# Patient Record
Sex: MALE | Race: WHITE | NOT HISPANIC OR LATINO
[De-identification: names, ages, dates, MRNs, and addresses within clinical notes are randomized per-mention and may not be internally consistent; named-entity substitution may affect disease eponyms.]

---

## 2022-12-06 ENCOUNTER — APPOINTMENT (OUTPATIENT)
Dept: UROLOGY | Facility: CLINIC | Age: 87
End: 2022-12-06

## 2022-12-06 VITALS
OXYGEN SATURATION: 99 % | BODY MASS INDEX: 28.93 KG/M2 | HEIGHT: 66 IN | HEART RATE: 78 BPM | RESPIRATION RATE: 18 BRPM | WEIGHT: 180 LBS | SYSTOLIC BLOOD PRESSURE: 150 MMHG | DIASTOLIC BLOOD PRESSURE: 94 MMHG | TEMPERATURE: 97.3 F

## 2022-12-06 DIAGNOSIS — N52.9 MALE ERECTILE DYSFUNCTION, UNSPECIFIED: ICD-10-CM

## 2022-12-06 DIAGNOSIS — I10 ESSENTIAL (PRIMARY) HYPERTENSION: ICD-10-CM

## 2022-12-06 PROCEDURE — 99204 OFFICE O/P NEW MOD 45 MIN: CPT

## 2022-12-06 RX ORDER — LEVOTHYROXINE SODIUM 0.17 MG/1
175 TABLET ORAL
Refills: 0 | Status: COMPLETED | COMMUNITY

## 2022-12-06 RX ORDER — METOPROLOL SUCCINATE 200 MG/1
TABLET, EXTENDED RELEASE ORAL
Refills: 0 | Status: COMPLETED | COMMUNITY

## 2022-12-06 RX ORDER — DOXAZOSIN 8 MG/1
8 TABLET ORAL
Qty: 2 | Refills: 0 | Status: ACTIVE | COMMUNITY
Start: 2022-07-22

## 2022-12-06 RX ORDER — TAMSULOSIN HYDROCHLORIDE 0.4 MG/1
CAPSULE ORAL
Refills: 0 | Status: ACTIVE | COMMUNITY

## 2022-12-06 RX ORDER — LEVOTHYROXINE SODIUM 0.07 MG/1
75 TABLET ORAL
Qty: 2 | Refills: 0 | Status: ACTIVE | COMMUNITY
Start: 2022-07-22

## 2022-12-06 RX ORDER — SAXAGLIPTIN 5 MG/1
TABLET, FILM COATED ORAL
Refills: 0 | Status: COMPLETED | COMMUNITY

## 2022-12-06 RX ORDER — FINASTERIDE 1 MG/1
TABLET ORAL
Refills: 0 | Status: COMPLETED | COMMUNITY

## 2022-12-06 RX ORDER — TADALAFIL 5 MG/1
5 TABLET ORAL
Refills: 0 | Status: ACTIVE | COMMUNITY

## 2022-12-06 RX ORDER — METOPROLOL SUCCINATE 50 MG/1
50 TABLET, EXTENDED RELEASE ORAL
Qty: 2 | Refills: 0 | Status: ACTIVE | COMMUNITY
Start: 2022-07-22

## 2022-12-06 RX ORDER — DOXAZOSIN 2 MG/1
2 TABLET ORAL
Refills: 0 | Status: COMPLETED | COMMUNITY

## 2022-12-06 RX ORDER — FINASTERIDE 5 MG/1
5 TABLET, FILM COATED ORAL
Qty: 2 | Refills: 0 | Status: ACTIVE | COMMUNITY
Start: 2022-07-22

## 2022-12-06 RX ORDER — SIMVASTATIN 40 MG/1
40 TABLET, FILM COATED ORAL
Refills: 0 | Status: ACTIVE | COMMUNITY

## 2022-12-06 RX ORDER — SAXAGLIPTIN 5 MG/1
5 TABLET, FILM COATED ORAL
Qty: 30 | Refills: 0 | Status: ACTIVE | COMMUNITY
Start: 2022-07-11

## 2022-12-06 RX ORDER — MIRABEGRON 50 MG/1
50 TABLET, FILM COATED, EXTENDED RELEASE ORAL
Qty: 30 | Refills: 0 | Status: COMPLETED | COMMUNITY
Start: 2022-05-17

## 2022-12-06 RX ORDER — LEVETIRACETAM 1000 MG/1
1000 TABLET, FILM COATED ORAL
Refills: 0 | Status: ACTIVE | COMMUNITY

## 2022-12-06 RX ORDER — TAMSULOSIN HYDROCHLORIDE 0.4 MG/1
0.4 CAPSULE ORAL
Qty: 2 | Refills: 0 | Status: ACTIVE | COMMUNITY
Start: 2022-07-22

## 2022-12-06 RX ORDER — MIRABEGRON 50 MG/1
TABLET, FILM COATED, EXTENDED RELEASE ORAL
Refills: 0 | Status: COMPLETED | COMMUNITY

## 2022-12-06 RX ORDER — METFORMIN ER 750 MG 750 MG/1
TABLET ORAL
Refills: 0 | Status: COMPLETED | COMMUNITY

## 2022-12-06 RX ORDER — METFORMIN ER 500 MG 500 MG/1
500 TABLET ORAL
Qty: 4 | Refills: 0 | Status: ACTIVE | COMMUNITY
Start: 2022-07-22

## 2022-12-06 RX ORDER — GABAPENTIN 600 MG/1
600 TABLET, COATED ORAL
Qty: 60 | Refills: 0 | Status: COMPLETED | COMMUNITY
Start: 2022-11-18

## 2022-12-06 RX ORDER — LEVETIRACETAM 500 MG/1
500 TABLET, FILM COATED ORAL
Qty: 60 | Refills: 0 | Status: COMPLETED | COMMUNITY
Start: 2021-11-23

## 2022-12-06 NOTE — HISTORY OF PRESENT ILLNESS
[FreeTextEntry1] : Alexys is an 86-year-old male born 1935 who was sent to me for several reasons\par \par 1 he has significant voiding dysfunction.  His AUA symptom score includes\par Incomplete emptying–2\par Frequency–4\par Intermittency–3\par Urgency–4\par Slow stream–3\par Straining–2\par He wakes up 5 times at night and his AUA symptom score is therefore 18/5/5.\par On rare occasions he will have the urge incontinence and please note this is with Myrbetriq 25 mg nightly.  He had tried the 50 but it gave him too many side effects\par \par In  he had been seen and advised to have cystolitholapaxy for numerous stones in the bladder.  At that point, an MRI had been done January 3, 2012 which showed a prostate of almost 70 g, post void about 189 mL, a thickened irregular bladder with wall thickness up to 5 mm and numerous partially calcified stones plus minus gravel.  Alexys is not sure if he ever had surgery on this as he just does not remember\par \par His other issue and the main reason he came in is because his wife  about 3 years ago, he does not recall getting morning erections for about 2 years, he had not had relations with her for the 6 months before she  that she was ill, now he is considering looking for spouse but he cannot get an erection he wonders what he has to offer.  He was sent here by Dr. Abigail Osullivan so that we can try and find out what can be done to determine if he has erections and if they are not good what can be done to augment them in a safe fashion. [Urinary Incontinence] : urinary incontinence [Urinary Urgency] : urinary urgency [Urinary Frequency] : urinary frequency [Nocturia] : nocturia [Straining] : straining [Weak Stream] : weak stream [Intermittency] : intermittency [Post-Void Dribbling] : post-void dribbling [Erectile Dysfunction] : Erectile Dysfunction [Dysuria] : no dysuria [Hematuria - Gross] : no gross hematuria

## 2022-12-06 NOTE — PHYSICAL EXAM
[General Appearance - Well Developed] : well developed [General Appearance - Well Nourished] : well nourished [Normal Appearance] : normal appearance [Well Groomed] : well groomed [General Appearance - In No Acute Distress] : no acute distress [Heart Rate And Rhythm] : Heart rate and rhythm were normal [] : no respiratory distress [Respiration, Rhythm And Depth] : normal respiratory rhythm and effort [Exaggerated Use Of Accessory Muscles For Inspiration] : no accessory muscle use [Auscultation Breath Sounds / Voice Sounds] : lungs were clear to auscultation bilaterally [Abdomen Soft] : soft [Abdomen Tenderness] : non-tender [Abdomen Hernia] : no hernia was discovered [Costovertebral Angle Tenderness] : no ~M costovertebral angle tenderness [Urethral Meatus] : meatus normal [Penis Abnormality] : normal circumcised penis [Normal Station and Gait] : the gait and station were normal for the patient's age [FreeTextEntry1] : DTRs decreased but acceptable scars from both knee replacements [Oriented To Time, Place, And Person] : oriented to person, place, and time [Affect] : the affect was normal [Mood] : the mood was normal [Not Anxious] : not anxious

## 2022-12-06 NOTE — ASSESSMENT
[FreeTextEntry1] : Alexys is an 87-year-old male with a history of stones he has no history of urologic surgery and the exam shows a questionable palpable bladder.  He has severe voiding symptoms with an AUA symptom score of 18/5/5.  We will send urine off for analysis and culture, I am going to want a renal and bladder ultrasound and if he can record of his intake and output.  When he comes back and we will review would probably do a flow study.\par \par He also has a small right hydrocele and a apricot size right spermatocele we will get a scrotal ultrasound to make sure that is all that is\par \par He has early dermatophytosis, recommend to use powder such as Goldbond\par \par The main reason that he came his erectile dysfunction going to be a little bit more difficult to ascertain.  He is unorthodox to does not have a sexual partner and is not sure if he should look for sexual partner if he does not have a working penis.  To see what may be the cause we will get hormones, I am going to want [place] criteria classification and please note he is already on Cialis daily I am not sure what I can add to an 87-year-old ago not sure he be able or in fact willing to do a self injection.  Depending on what the hormones show we may decide to replace them but again he already has voiding dysfunction and if we had testosterone it may accelerate his BPH which is probably already a problem.\par \par We will try and see what is going on and then discuss options.\par \par Please note I did discuss with him that has less he is looking for very a woman he may find that a mature woman is happy to have companionship and may not be interested in sexual activity anyway.  He is indeed confirmed that if he had a woman that wanted companionship and did not care about sexual activity is not that interested either other than that he thinks he like a  and he does not think they would want a  that cannot provide sex.

## 2022-12-06 NOTE — LETTER HEADER
[FreeTextEntry3] : Rosa Pride M.D.\par Director Emeritus of Urology\par [facility]\par [address] [place] [address], [address]\[place] [address]

## 2022-12-06 NOTE — LETTER BODY
[Dear  ___] : Dear  [unfilled], [Consult Letter:] : I had the pleasure of evaluating your patient, [unfilled]. [Please see my note below.] : Please see my note below. [Consult Closing:] : Thank you very much for allowing me to participate in the care of this patient.  If you have any questions, please do not hesitate to contact me. [Sincerely,] : Sincerely, [FreeTextEntry2] : Abigail Osullivan MD\par [facility]\par [address] [facility]\par [place] [id number]

## 2022-12-08 LAB
APPEARANCE: CLEAR
BILIRUBIN URINE: NEGATIVE
BLOOD URINE: NEGATIVE
COLOR: NORMAL
GLUCOSE QUALITATIVE U: NEGATIVE
KETONES URINE: NEGATIVE
LEUKOCYTE ESTERASE URINE: NEGATIVE
NITRITE URINE: NEGATIVE
PH URINE: 5.5
PROTEIN URINE: NEGATIVE
SPECIFIC GRAVITY URINE: 1.02
UROBILINOGEN URINE: NORMAL

## 2022-12-13 ENCOUNTER — NON-APPOINTMENT (OUTPATIENT)
Age: 87
End: 2022-12-13

## 2022-12-13 LAB — BACTERIA UR CULT: ABNORMAL

## 2022-12-13 RX ORDER — NITROFURANTOIN (MONOHYDRATE/MACROCRYSTALS) 25; 75 MG/1; MG/1
100 CAPSULE ORAL
Qty: 14 | Refills: 0 | Status: ACTIVE | COMMUNITY
Start: 2022-12-13 | End: 1900-01-01

## 2022-12-15 ENCOUNTER — NON-APPOINTMENT (OUTPATIENT)
Age: 87
End: 2022-12-15

## 2022-12-16 ENCOUNTER — NON-APPOINTMENT (OUTPATIENT)
Age: 87
End: 2022-12-16

## 2022-12-19 ENCOUNTER — NON-APPOINTMENT (OUTPATIENT)
Age: 87
End: 2022-12-19

## 2022-12-19 LAB — BACTERIA UR CULT: NORMAL

## 2022-12-20 ENCOUNTER — NON-APPOINTMENT (OUTPATIENT)
Age: 87
End: 2022-12-20

## 2022-12-23 ENCOUNTER — NON-APPOINTMENT (OUTPATIENT)
Age: 87
End: 2022-12-23

## 2022-12-28 ENCOUNTER — LABORATORY RESULT (OUTPATIENT)
Age: 87
End: 2022-12-28

## 2022-12-28 ENCOUNTER — APPOINTMENT (OUTPATIENT)
Dept: UROLOGY | Facility: CLINIC | Age: 87
End: 2022-12-28
Payer: MEDICARE

## 2022-12-28 VITALS
BODY MASS INDEX: 29.25 KG/M2 | OXYGEN SATURATION: 98 % | TEMPERATURE: 97.2 F | DIASTOLIC BLOOD PRESSURE: 67 MMHG | RESPIRATION RATE: 16 BRPM | HEIGHT: 66 IN | WEIGHT: 182 LBS | SYSTOLIC BLOOD PRESSURE: 137 MMHG | HEART RATE: 71 BPM

## 2022-12-28 PROCEDURE — 99215 OFFICE O/P EST HI 40 MIN: CPT

## 2022-12-28 RX ORDER — MIRABEGRON 25 MG/1
25 TABLET, FILM COATED, EXTENDED RELEASE ORAL
Qty: 2 | Refills: 0 | Status: DISCONTINUED | COMMUNITY
Start: 2022-07-22 | End: 2022-12-28

## 2022-12-28 NOTE — ASSESSMENT
[FreeTextEntry1] : With respect to his voiding he needs to stop the Myrbetriq as he has a very high postvoid residual.  The reason he is urinating so often is incomplete emptying.  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  We will going to draw a BMP and a CBC on him today if he elects I would put in a catheter and then wait drawing down a little bit at a time until he is healthy and then send him home with a catheter.  We will then get repeat bloods and probably refer him to Dr. Pond to check his bladder see if it is working and consider treatment depending on what the urodynamic show.  The bladder stones will have to be removed I do not know if this is bladder bladder outlet or both.  Given his current exam I am assuming that at some point someone treated his prostate and existing bladder stones he has no recollection.\par \par As far as the scrotum he has the complex right spermatocele including a solid portion as well as calcification in the scrotal MRI was advised\par \par As far as his ED he never did the hormones and never spoke to his PCP know right now it is probably not high on the list of concern if he wants to address that he will need to get me the date of

## 2022-12-28 NOTE — PHYSICAL EXAM
[General Appearance - Well Developed] : well developed [General Appearance - Well Nourished] : well nourished [Normal Appearance] : normal appearance [Well Groomed] : well groomed [General Appearance - In No Acute Distress] : no acute distress [Abdomen Soft] : soft [Abdomen Tenderness] : non-tender [Costovertebral Angle Tenderness] : no ~M costovertebral angle tenderness [] : no respiratory distress [Respiration, Rhythm And Depth] : normal respiratory rhythm and effort [Exaggerated Use Of Accessory Muscles For Inspiration] : no accessory muscle use [Oriented To Time, Place, And Person] : oriented to person, place, and time [Affect] : the affect was normal [Mood] : the mood was normal [Not Anxious] : not anxious [Normal Station and Gait] : the gait and station were normal for the patient's age [FreeTextEntry1] : prostate > 15-20 grams and firm

## 2022-12-28 NOTE — LETTER BODY
[Dear  ___] : Dear  [unfilled], [Please see my note below.] : Please see my note below. [Sincerely,] : Sincerely, [Courtesy Letter:] : I had the pleasure of seeing your patient, [unfilled], in my office today. [FreeTextEntry2] : Abigail Osullivan MD\par [facility]\par [address] [facility]\par [place] [id number]

## 2022-12-28 NOTE — LETTER HEADER
[FreeTextEntry3] : Rosa Pride M.D.\par Director Emeritus of Urology\par [facility]\par [address] [place] [address], [address]\[place] [address]

## 2022-12-28 NOTE — HISTORY OF PRESENT ILLNESS
[Urinary Incontinence] : urinary incontinence [Urinary Urgency] : urinary urgency [Urinary Frequency] : urinary frequency [Nocturia] : nocturia [Straining] : straining [Weak Stream] : weak stream [Intermittency] : intermittency [Post-Void Dribbling] : post-void dribbling [Erectile Dysfunction] : Erectile Dysfunction [FreeTextEntry1] : Alexys is an 87-year-old male born November 8, 1935 seen December 6, 2022 complaining of\par Severe lower urinary tract symptoms for which she had him get a urinalysis and culture as well as a renal bladder ultrasound.  He has been told to have a cystolitholapaxy as far back as 2012 which at that time showed a prostate of 70 g and he does not remember if he ever did it.\par \par The culture came back as E. coli, because of his symptoms we started him on nitrofurantoin.  He had a repeat the culture that was done on December 22.  Please notify the urine on antibiotics he has noted no change in urination\par \par We sent him for the ultrasound which was done December 20 which the radiologist called me about please see the reports below but it showed severe obstructive uropathy.  Please note he is on Myrbetriq for urgency frequency\par \par He also had a lump in the right hemiscrotum we sent her for an ultrasound that was done on December 22, 2022\par \par \par He may want to get a partner but he does not know of his penis because it does not work I wanted hormones and [place] criteria.  He did not do the blood tests and when he went to see his primary care doctor he forgot to given the [place] criteria\par \par We brought him here urgently because of the bladder ultrasound and a UTI.\par  [Dysuria] : no dysuria [Hematuria - Gross] : no gross hematuria

## 2022-12-29 ENCOUNTER — NON-APPOINTMENT (OUTPATIENT)
Age: 87
End: 2022-12-29

## 2022-12-30 ENCOUNTER — APPOINTMENT (OUTPATIENT)
Dept: UROLOGY | Facility: CLINIC | Age: 87
End: 2022-12-30
Payer: MEDICARE

## 2022-12-30 VITALS
HEART RATE: 70 BPM | SYSTOLIC BLOOD PRESSURE: 137 MMHG | DIASTOLIC BLOOD PRESSURE: 73 MMHG | BODY MASS INDEX: 29.73 KG/M2 | HEIGHT: 66 IN | WEIGHT: 185 LBS

## 2022-12-30 DIAGNOSIS — A49.9 URINARY TRACT INFECTION, SITE NOT SPECIFIED: ICD-10-CM

## 2022-12-30 DIAGNOSIS — N39.0 URINARY TRACT INFECTION, SITE NOT SPECIFIED: ICD-10-CM

## 2022-12-30 DIAGNOSIS — N43.41 SPERMATOCELE OF EPIDIDYMIS, SINGLE: ICD-10-CM

## 2022-12-30 LAB
ANION GAP SERPL CALC-SCNC: 9 MMOL/L
BUN SERPL-MCNC: 40 MG/DL
CALCIUM SERPL-MCNC: 9.6 MG/DL
CHLORIDE SERPL-SCNC: 106 MMOL/L
CO2 SERPL-SCNC: 26 MMOL/L
CREAT SERPL-MCNC: 1.6 MG/DL
EGFR: 42 ML/MIN/1.73M2
GLUCOSE SERPL-MCNC: 128 MG/DL
POTASSIUM SERPL-SCNC: 5.2 MMOL/L
SODIUM SERPL-SCNC: 141 MMOL/L

## 2022-12-30 PROCEDURE — 99214 OFFICE O/P EST MOD 30 MIN: CPT

## 2022-12-30 NOTE — HISTORY OF PRESENT ILLNESS
[Urinary Incontinence] : urinary incontinence [Urinary Urgency] : urinary urgency [Urinary Frequency] : urinary frequency [Nocturia] : nocturia [Straining] : straining [Weak Stream] : weak stream [Intermittency] : intermittency [Post-Void Dribbling] : post-void dribbling [Erectile Dysfunction] : Erectile Dysfunction [FreeTextEntry1] : Alexys is an 87-year-old male born November 8, 1935 seen December 28, 2022 for an urgent visit and an ultrasound showed significant bilateral hydroureteronephrosis incomplete bladder emptying with bladder stones.\par He also had a lump in the right hemiscrotum that the radiologist felt needed an MRI\par \par We had him stop the Myrbetriq as he had a very high postvoid, his frequency of urination was not due to bladder instability.  He is aware that I do not know if his bladder can work I had wanted to put in a catheter which she refused in the interim we sent him for a BMP and a CBC we had a CAT scan and still needs an MRI as soon as we can.  I also wanted to repeat the urinalysis and culture as he had an infection and finish the antibiotics. He did that 12/22 and that was NG\par \par \par He tells me since stopping the Myrbetriq his stream is much stronger he feels a large\par Was coming out and his urine is clean\par  [Dysuria] : no dysuria [Hematuria - Gross] : no gross hematuria

## 2022-12-30 NOTE — ASSESSMENT
[FreeTextEntry1] : I again recommended a catheter he feels he is urinating much better since he stopped the Myrbetriq and wants to hold off over the weekend.  He understands that an elevated potassium was 5.2 is not dangerous but if it continues to go up can be and that his bladder outlet obstruction is compromising his renal function.\par \par He is going to need cystolitholapaxy, and will also be difficult because of the stones in the diverticulum and we may have to do with diverticuli to me.  It is probable that he will need relief from bladder outlet obstruction.  Even if his bladder has been no prescription does not work if we could make his outflow resistance less than normal without making him incontinent he may be able to void better with Valsalva.  He testy right now some of the urine is coming out by itself but it does not come out as good as he can if he is not pushing.  I am hopeful that he retains some bladder function but he is aware that may not be enough\par \par He still needs the MRI of the scrotum though that is not a high priority is something we will need to get\par \par He is declining catheterization even CIC and is willing to follow-up next week knowing that his potassium and his creatinine are elevated\par \par If his residual has dropped next week and he is willing to accept that he may have prostate surgery in the bladder may not work I think he can be booked for cystolitholapaxy and a simultaneous TURP.  If he wants to know if the TURP will work or if his bladder is nonfunctional then he may need urodynamics either before or after cystolitholapaxy\par \par \par As far as his anemia and other blood issues he will discuss that with his PCP\par \par If he decides he wants testing I will send him to Dr. Pond for video urodynamics especially given the diverticulum the video would be quite important

## 2022-12-30 NOTE — LETTER BODY
[Dear  ___] : Dear  [unfilled], [Courtesy Letter:] : I had the pleasure of seeing your patient, [unfilled], in my office today. [Please see my note below.] : Please see my note below. [Sincerely,] : Sincerely, [FreeTextEntry2] : Abigail Osullivan MD\par [facility]\par [address] [facility]\par [place] [id number]

## 2022-12-30 NOTE — ADDENDUM
[FreeTextEntry1] : Patient was told us that has been trying to reach him numerous times for example when his potassium came back at 5.2 and there was no answer.  He acknowledges that sometimes he forgets his cell phone at home he will try and remember to take it with him

## 2022-12-30 NOTE — PHYSICAL EXAM
[General Appearance - Well Developed] : well developed [General Appearance - Well Nourished] : well nourished [Normal Appearance] : normal appearance [Well Groomed] : well groomed [General Appearance - In No Acute Distress] : no acute distress [] : no respiratory distress [Respiration, Rhythm And Depth] : normal respiratory rhythm and effort [Exaggerated Use Of Accessory Muscles For Inspiration] : no accessory muscle use [Oriented To Time, Place, And Person] : oriented to person, place, and time [Affect] : the affect was normal [Mood] : the mood was normal [Not Anxious] : not anxious [Normal Station and Gait] : the gait and station were normal for the patient's age [FreeTextEntry1] : BMI equals 29

## 2022-12-30 NOTE — LETTER HEADER
[FreeTextEntry3] : Rosa Pride M.D.\par Director Emeritus of Urology\par [facility]\par [address] [place] [address], [address]\[place] [address]

## 2023-01-02 LAB
APPEARANCE: CLEAR
BACTERIA UR CULT: NORMAL
BILIRUBIN URINE: NEGATIVE
BLOOD URINE: NEGATIVE
COLOR: NORMAL
GLUCOSE QUALITATIVE U: NEGATIVE
KETONES URINE: NEGATIVE
LEUKOCYTE ESTERASE URINE: NEGATIVE
NITRITE URINE: NEGATIVE
PH URINE: 5.5
PROTEIN URINE: NEGATIVE
SPECIFIC GRAVITY URINE: 1.02
UROBILINOGEN URINE: NORMAL

## 2023-01-03 ENCOUNTER — LABORATORY RESULT (OUTPATIENT)
Age: 88
End: 2023-01-03

## 2023-01-03 LAB
ALBUMIN SERPL ELPH-MCNC: 4.5 G/DL
ALP BLD-CCNC: 73 U/L
ALT SERPL-CCNC: 14 U/L
AST SERPL-CCNC: 13 U/L
BACTERIA UR CULT: NORMAL
BASOPHILS # BLD AUTO: 0.02 K/UL
BASOPHILS NFR BLD AUTO: 0.4 %
BILIRUB DIRECT SERPL-MCNC: <0.2 MG/DL
BILIRUB INDIRECT SERPL-MCNC: >0.5 MG/DL
BILIRUB SERPL-MCNC: 0.7 MG/DL
EOSINOPHIL # BLD AUTO: 0.09 K/UL
EOSINOPHIL NFR BLD AUTO: 1.8 %
HCT VFR BLD CALC: 37.2 %
HGB BLD-MCNC: 11.9 G/DL
IMM GRANULOCYTES NFR BLD AUTO: 0.4 %
LYMPHOCYTES # BLD AUTO: 0.62 K/UL
LYMPHOCYTES NFR BLD AUTO: 12.2 %
MAN DIFF?: NORMAL
MCHC RBC-ENTMCNC: 31.1 PG
MCHC RBC-ENTMCNC: 32 G/DL
MCV RBC AUTO: 97.1 FL
MONOCYTES # BLD AUTO: 0.61 K/UL
MONOCYTES NFR BLD AUTO: 12 %
NEUTROPHILS # BLD AUTO: 3.74 K/UL
NEUTROPHILS NFR BLD AUTO: 73.2 %
PLATELET # BLD AUTO: 102 K/UL
PROT SERPL-MCNC: 6.6 G/DL
RBC # BLD: 3.83 M/UL
RBC # FLD: 13.2 %
WBC # FLD AUTO: 5.1 K/UL

## 2023-01-05 ENCOUNTER — APPOINTMENT (OUTPATIENT)
Dept: UROLOGY | Facility: CLINIC | Age: 88
End: 2023-01-05
Payer: MEDICARE

## 2023-01-05 VITALS
WEIGHT: 184 LBS | BODY MASS INDEX: 29.57 KG/M2 | HEIGHT: 66 IN | HEART RATE: 60 BPM | SYSTOLIC BLOOD PRESSURE: 141 MMHG | DIASTOLIC BLOOD PRESSURE: 73 MMHG

## 2023-01-05 DIAGNOSIS — R35.1 NOCTURIA: ICD-10-CM

## 2023-01-05 DIAGNOSIS — N43.3 HYDROCELE, UNSPECIFIED: ICD-10-CM

## 2023-01-05 DIAGNOSIS — N39.41 URGE INCONTINENCE: ICD-10-CM

## 2023-01-05 DIAGNOSIS — E87.5 HYPERKALEMIA: ICD-10-CM

## 2023-01-05 DIAGNOSIS — R30.0 DYSURIA: ICD-10-CM

## 2023-01-05 DIAGNOSIS — R39.13 SPLITTING OF URINARY STREAM: ICD-10-CM

## 2023-01-05 DIAGNOSIS — R93.89 ABNORMAL FINDINGS ON DIAGNOSTIC IMAGING OF OTHER SPECIFIED BODY STRUCTURES: ICD-10-CM

## 2023-01-05 DIAGNOSIS — R39.198 OTHER DIFFICULTIES WITH MICTURITION: ICD-10-CM

## 2023-01-05 DIAGNOSIS — N50.0 ATROPHY OF TESTIS: ICD-10-CM

## 2023-01-05 DIAGNOSIS — R35.0 FREQUENCY OF MICTURITION: ICD-10-CM

## 2023-01-05 LAB
ESTRADIOL SERPL-MCNC: 25 PG/ML
LH SERPL-ACNC: 6.3 IU/L
PROLACTIN SERPL-MCNC: 6.7 NG/ML
PSA FREE FLD-MCNC: 31 %
PSA FREE SERPL-MCNC: 0.56 NG/ML
PSA SERPL-MCNC: 1.82 NG/ML
SHBG SERPL-SCNC: 36.8 NMOL/L

## 2023-01-05 PROCEDURE — 51741 ELECTRO-UROFLOWMETRY FIRST: CPT

## 2023-01-05 PROCEDURE — 51798 US URINE CAPACITY MEASURE: CPT

## 2023-01-05 PROCEDURE — 99214 OFFICE O/P EST MOD 30 MIN: CPT | Mod: 25

## 2023-01-05 NOTE — ASSESSMENT
[FreeTextEntry1] : He is still refusing catheter, I have made arrangements for him to see our pelvic  Dr dennis tomorrow as he would be willing to have a procedure which may then involve a catheter but he is not willing to just get a catheter and wait and see what happens.  He understands the risks including that of kidney damage, infection and that even if his prostate is treated and that this point I am not sure the problem is his prostate the bladder may have been over distended for so long that it will not work.\par \par As far as his testicular atrophy, I will see him again after his voiding/quantity of life issue is optimized at that point we will see what his hormones are and see what we can do, assuming his [place] criteria classification is equal to"I" to augment his erections

## 2023-01-05 NOTE — HISTORY OF PRESENT ILLNESS
[FreeTextEntry1] : Alexys is an 86-year-old 87-year-old male born November 8, 1935 last seen December 30, 2022.\par  His studies has had shown severe hydroureteronephrosis hydroureteronephrosis with a very high postvoid results void residual.\par We had sent him for blood tests all to check his hormones as he has testicular atrophy, he is interested in seeing if his erections can be flexed as he is considering looking for another partner, to check his anemia to see if it was real.  We had stopped his Myrbetriq he told me he was voiding much better we asked him to keep a record of his intake and output and he would come in today we will do a review probably a flow study and see what his residual was now that he has been off the Myrbetriq for over a week.\par \par He tells me that he is urinating much better going with some frequency but he says a much better flow.\par \par We had him do a uroflow and the urine was trickling out so slow that the machine did not measure he urinated out about 100 mL and his postvoid was 1121 mL.

## 2023-01-05 NOTE — LETTER BODY
[Dear  ___] : Dear  [unfilled], [Courtesy Letter:] : I had the pleasure of seeing your patient, [unfilled], in my office today. [Please see my note below.] : Please see my note below. [Sincerely,] : Sincerely, [FreeTextEntry2] : Abigail Osullivan MD\par [facility]\par [address] [facility]\par [place] [id number]

## 2023-01-05 NOTE — LETTER HEADER
[FreeTextEntry3] : Rosa Pride M.D.\par Director Emeritus of Urology\par [facility]\par [address] [place] [address], [address]\[place] [address]

## 2023-01-05 NOTE — PHYSICAL EXAM
[General Appearance - Well Developed] : well developed [General Appearance - Well Nourished] : well nourished [Normal Appearance] : normal appearance [Well Groomed] : well groomed [General Appearance - In No Acute Distress] : no acute distress [Penis Abnormality] : normal circumcised penis [Testes Tenderness] : no tenderness of the testes [] : no respiratory distress [Respiration, Rhythm And Depth] : normal respiratory rhythm and effort [Exaggerated Use Of Accessory Muscles For Inspiration] : no accessory muscle use [Oriented To Time, Place, And Person] : oriented to person, place, and time [Affect] : the affect was normal [Mood] : the mood was normal [FreeTextEntry1] : Anxious [Normal Station and Gait] : the gait and station were normal for the patient's age

## 2023-01-06 ENCOUNTER — APPOINTMENT (OUTPATIENT)
Dept: UROLOGY | Facility: CLINIC | Age: 88
End: 2023-01-06
Payer: MEDICARE

## 2023-01-06 VITALS
OXYGEN SATURATION: 98 % | SYSTOLIC BLOOD PRESSURE: 122 MMHG | HEART RATE: 71 BPM | WEIGHT: 184 LBS | BODY MASS INDEX: 29.57 KG/M2 | HEIGHT: 66 IN | TEMPERATURE: 97.5 F | DIASTOLIC BLOOD PRESSURE: 65 MMHG | RESPIRATION RATE: 18 BRPM

## 2023-01-06 DIAGNOSIS — Z00.00 ENCOUNTER FOR GENERAL ADULT MEDICAL EXAMINATION W/OUT ABNORMAL FINDINGS: ICD-10-CM

## 2023-01-06 DIAGNOSIS — R39.15 URGENCY OF URINATION: ICD-10-CM

## 2023-01-06 DIAGNOSIS — E11.9 TYPE 2 DIABETES MELLITUS W/OUT COMPLICATIONS: ICD-10-CM

## 2023-01-06 LAB
BILIRUB UR QL STRIP: NORMAL
COLLECTION METHOD: NORMAL
GLUCOSE UR-MCNC: NORMAL
HCG UR QL: 0.2 EU/DL
HGB UR QL STRIP.AUTO: NORMAL
KETONES UR-MCNC: NORMAL
LEUKOCYTE ESTERASE UR QL STRIP: NORMAL
NITRITE UR QL STRIP: NORMAL
PH UR STRIP: 5.5
PROT UR STRIP-MCNC: NORMAL
SP GR UR STRIP: 1.01

## 2023-01-06 PROCEDURE — 99215 OFFICE O/P EST HI 40 MIN: CPT | Mod: 24,25

## 2023-01-06 PROCEDURE — 51702 INSERT TEMP BLADDER CATH: CPT

## 2023-01-09 ENCOUNTER — NON-APPOINTMENT (OUTPATIENT)
Age: 88
End: 2023-01-09

## 2023-01-09 PROBLEM — E11.9 DIABETES: Status: ACTIVE | Noted: 2022-12-06

## 2023-01-09 PROBLEM — R39.15 URGENCY OF URINATION: Status: ACTIVE | Noted: 2022-12-06

## 2023-01-09 LAB
TESTOST FREE SERPL-MCNC: 12.7 PG/ML
TESTOST SERPL-MCNC: 641 NG/DL
TESTOSTERONE FREE/WEAKLY BND: 80.6 NG/DL
TESTOSTERONE TOTAL S: 534 NG/DL
TESTOSTERONE, % FREE/WEAKLY BND: 15.1 %

## 2023-01-09 NOTE — ASSESSMENT
[FreeTextEntry1] : 87 y/o male seen at the request of  large volume urinary retention and bilateral hydroureteronephrosis. I had a long discussion with him about the pathophysiology and how this may have occurred. After the discussion, we agreed to catheterize him and have him empty out his bladder. 1.5 L was drained and the pt agreed to have the catheter left in. He will come back next Wednesday for a VUDS and we will make further decisions about a possible cystoscopy at that time. All his questions were otherwise answered. Total time = 50 mins \par \par The submitted E/M billing level for this visit reflects the total time spent on the day of the visit including face-to-face time spent with the patient, non-face-to-face review of medical records and relevant information, documentation, and asynchronous communication with the patient after a visit via phone, email, or patient’s EHR portal after the visit. \par The medical records reviewed are either scanned into the chart or reviewed with the patient using a patient’s electronic medical records portal for patients with records not available to [facility] via electronic transmission platforms from other institutions and labs. \par Time spend counseling and performing coordination of care was also included in determining the appropriate EM billing level.\par \par I have reviewed and verified information regarding the chief complaint and history recorded by the ancillary staff and/or the patient. I have independently reviewed and interpreted tests performed by other physicians and facilities as necessary. \par \par I have discussed with the patient differential diagnosis, reason for auxiliary tests if ordered, risks, benefits, alternatives, and complications of each form of therapy were discussed.\par

## 2023-01-09 NOTE — HISTORY OF PRESENT ILLNESS
[FreeTextEntry1] : 88 y/o male seen at the request of Dr. Pride for large volume urinary retention and bilateral hydroureteronephrosis (please see Dr. Pride's note for additional details). I have reviewed his CT entirely.

## 2023-01-11 ENCOUNTER — APPOINTMENT (OUTPATIENT)
Dept: UROLOGY | Facility: HOSPITAL | Age: 88
End: 2023-01-11
Payer: MEDICARE

## 2023-01-11 ENCOUNTER — TRANSCRIPTION ENCOUNTER (OUTPATIENT)
Age: 88
End: 2023-01-11

## 2023-01-11 ENCOUNTER — OUTPATIENT (OUTPATIENT)
Dept: OUTPATIENT SERVICES | Facility: HOSPITAL | Age: 88
LOS: 1 days | Discharge: HOME | End: 2023-01-11

## 2023-01-11 DIAGNOSIS — N40.1 BENIGN PROSTATIC HYPERPLASIA WITH LOWER URINARY TRACT SYMPMS: ICD-10-CM

## 2023-01-11 DIAGNOSIS — N21.0 CALCULUS IN BLADDER: ICD-10-CM

## 2023-01-11 DIAGNOSIS — R33.9 RETENTION OF URINE, UNSPECIFIED: ICD-10-CM

## 2023-01-11 DIAGNOSIS — N13.8 BENIGN PROSTATIC HYPERPLASIA WITH LOWER URINARY TRACT SYMPMS: ICD-10-CM

## 2023-01-11 DIAGNOSIS — N13.30 UNSPECIFIED HYDRONEPHROSIS: ICD-10-CM

## 2023-01-11 PROCEDURE — 51784 ANAL/URINARY MUSCLE STUDY: CPT | Mod: 26

## 2023-01-11 PROCEDURE — 51600 INJECTION FOR BLADDER X-RAY: CPT

## 2023-01-11 PROCEDURE — 76000 FLUOROSCOPY <1 HR PHYS/QHP: CPT | Mod: 26,59

## 2023-01-11 PROCEDURE — 51797 INTRAABDOMINAL PRESSURE TEST: CPT | Mod: 26

## 2023-01-11 PROCEDURE — 51728 CYSTOMETROGRAM W/VP: CPT | Mod: 26

## 2023-01-17 ENCOUNTER — APPOINTMENT (OUTPATIENT)
Dept: UROLOGY | Facility: CLINIC | Age: 88
End: 2023-01-17

## 2023-01-23 ENCOUNTER — APPOINTMENT (OUTPATIENT)
Dept: UROLOGY | Facility: CLINIC | Age: 88
End: 2023-01-23

## 2023-05-08 ENCOUNTER — APPOINTMENT (OUTPATIENT)
Dept: UROLOGY | Facility: CLINIC | Age: 88
End: 2023-05-08

## 2024-06-06 ENCOUNTER — APPOINTMENT (OUTPATIENT)
Dept: UROLOGY | Facility: CLINIC | Age: 89
End: 2024-06-06

## 2024-08-19 ENCOUNTER — NEW REFERRAL (OUTPATIENT)
Dept: URBAN - METROPOLITAN AREA CLINIC 103 | Facility: CLINIC | Age: 89
End: 2024-08-19

## 2024-08-19 VITALS — SYSTOLIC BLOOD PRESSURE: 150 MMHG | DIASTOLIC BLOOD PRESSURE: 82 MMHG

## 2024-08-19 DIAGNOSIS — H35.373: ICD-10-CM

## 2024-08-19 DIAGNOSIS — H35.3132: ICD-10-CM

## 2024-08-19 DIAGNOSIS — H25.813: ICD-10-CM

## 2024-08-19 DIAGNOSIS — E11.9: ICD-10-CM

## 2024-08-19 PROCEDURE — 92202 OPSCPY EXTND ON/MAC DRAW: CPT

## 2024-08-19 PROCEDURE — 92004 COMPRE OPH EXAM NEW PT 1/>: CPT

## 2024-08-19 PROCEDURE — 92134 CPTRZ OPH DX IMG PST SGM RTA: CPT

## 2024-08-19 ASSESSMENT — VISUAL ACUITY
OD_CC: 20/70+1
OD_SC: 20/200
OS_SC: 20/200
OS_CC: 20/150
OS_PH: 20/80-1

## 2024-08-19 ASSESSMENT — TONOMETRY
OD_IOP_MMHG: 8
OS_IOP_MMHG: 9

## 2024-12-04 NOTE — REASON FOR VISIT
[Follow-up Visit ___] : a follow-up visit  for [unfilled]